# Patient Record
Sex: MALE | Race: BLACK OR AFRICAN AMERICAN | URBAN - METROPOLITAN AREA
[De-identification: names, ages, dates, MRNs, and addresses within clinical notes are randomized per-mention and may not be internally consistent; named-entity substitution may affect disease eponyms.]

---

## 2021-01-04 ENCOUNTER — OFFICE VISIT (OUTPATIENT)
Dept: URBAN - METROPOLITAN AREA CLINIC 92 | Facility: CLINIC | Age: 61
End: 2021-01-04

## 2022-06-22 ENCOUNTER — OFFICE VISIT (OUTPATIENT)
Dept: URBAN - METROPOLITAN AREA CLINIC 92 | Facility: CLINIC | Age: 62
End: 2022-06-22

## 2022-09-27 ENCOUNTER — OFFICE VISIT (OUTPATIENT)
Dept: URBAN - METROPOLITAN AREA CLINIC 92 | Facility: CLINIC | Age: 62
End: 2022-09-27
Payer: COMMERCIAL

## 2022-09-27 ENCOUNTER — DASHBOARD ENCOUNTERS (OUTPATIENT)
Age: 62
End: 2022-09-27

## 2022-09-27 ENCOUNTER — WEB ENCOUNTER (OUTPATIENT)
Dept: URBAN - METROPOLITAN AREA CLINIC 92 | Facility: CLINIC | Age: 62
End: 2022-09-27

## 2022-09-27 VITALS
TEMPERATURE: 97.1 F | HEART RATE: 66 BPM | HEIGHT: 73 IN | BODY MASS INDEX: 16.7 KG/M2 | WEIGHT: 126 LBS | SYSTOLIC BLOOD PRESSURE: 120 MMHG | DIASTOLIC BLOOD PRESSURE: 84 MMHG

## 2022-09-27 DIAGNOSIS — R13.19 ESOPHAGEAL DYSPHAGIA: ICD-10-CM

## 2022-09-27 DIAGNOSIS — K21.9 GASTROESOPHAGEAL REFLUX DISEASE WITHOUT ESOPHAGITIS: ICD-10-CM

## 2022-09-27 DIAGNOSIS — K59.04 CHRONIC IDIOPATHIC CONSTIPATION: ICD-10-CM

## 2022-09-27 DIAGNOSIS — Z12.11 SCREENING FOR COLON CANCER: ICD-10-CM

## 2022-09-27 DIAGNOSIS — K08.9 POOR DENTITION: ICD-10-CM

## 2022-09-27 PROBLEM — 82934008: Status: ACTIVE | Noted: 2022-09-27

## 2022-09-27 PROBLEM — 266435005: Status: ACTIVE | Noted: 2022-09-27

## 2022-09-27 PROCEDURE — 99204 OFFICE O/P NEW MOD 45 MIN: CPT

## 2022-09-27 RX ORDER — ONDANSETRON 4 MG/1
1 TABLET ON THE TONGUE AND ALLOW TO DISSOLVE TABLET, ORALLY DISINTEGRATING ORAL ONCE A DAY
Status: ACTIVE | COMMUNITY

## 2022-09-27 RX ORDER — BUDESONIDE AND FORMOTEROL FUMARATE DIHYDRATE 160; 4.5 UG/1; UG/1
2 PUFFS AEROSOL RESPIRATORY (INHALATION) TWICE A DAY
Status: ACTIVE | COMMUNITY

## 2022-09-27 RX ORDER — METOPROLOL SUCCINATE 25 MG/1
1 TABLET TABLET, FILM COATED, EXTENDED RELEASE ORAL ONCE A DAY
Status: ACTIVE | COMMUNITY

## 2022-09-27 RX ORDER — FERROUS SULFATE 325(65) MG
1 TABLET TABLET ORAL ONCE A DAY
Status: ACTIVE | COMMUNITY

## 2022-09-27 RX ORDER — CLOPIDOGREL 75 MG/1
1 TABLET TABLET, FILM COATED ORAL ONCE A DAY
Status: ACTIVE | COMMUNITY

## 2022-09-27 RX ORDER — NITROGLYCERIN 0.3 MG/1
AS DIRECTED TABLET SUBLINGUAL
Status: ACTIVE | COMMUNITY

## 2022-09-27 RX ORDER — ROSUVASTATIN CALCIUM 20 MG/1
1 TABLET TABLET, FILM COATED ORAL ONCE A DAY
Status: ACTIVE | COMMUNITY

## 2022-09-27 RX ORDER — LISINOPRIL AND HYDROCHLOROTHIAZIDE 20; 25 MG/1; MG/1
1 TABLET TABLET ORAL ONCE A DAY
Status: ACTIVE | COMMUNITY

## 2022-09-27 RX ORDER — APIXABAN 5 MG/1
1 TABLET TABLET, FILM COATED ORAL TWICE A DAY
Status: ACTIVE | COMMUNITY

## 2022-09-27 NOTE — HPI-TODAY'S VISIT:
62-year-old patient presents today for dysphagia and colonoscopy screening.  He was sent to us by Dr. Ana Segundo.  A copy of this note will be sent to referring provider.  Inpatient EGD 12- acute and erosive esophagitis was done due to J LUIS, dyspepsia, coffee-ground emesis, hematemesis nausea with vomiting biopsies were negative for H. pylori or any mucosal abnormality.  Patient had a stroke in 1979 and is unable to speak.  He is seen by his family friend who lives with patient to translate.  Patient states he has never had a colonoscopy before.  He has 1 bowel movement a day and states he is usually constipated and has to strain.  He has never been on anything for constipation in the past.  Denies hematochezia, melena, diarrhea, unexpected weight loss.  Denies family history of colon cancer or colon polyps. He also has dysphagia to all liquids and solids has been present for 2 years.  Denies any prior radiation or surgery in neck area.  He does have poor dentition and has not able to chew food.  Caregiver states they have been grinding up all his food and he has less sx with this.  He does have heartburn and regurgitation and has to take Tums almost every day.  He has not been on maintenance PPI or H2 blocker in the past.  Denies odynophagia, NSAID use. He is on Eliquis and Plavix per PCP's last note due to an NSTEMI patient is not aware of this.  He was told that we will will need cardiac clearance prior to procedures.

## 2022-11-08 ENCOUNTER — TELEPHONE ENCOUNTER (OUTPATIENT)
Dept: URBAN - METROPOLITAN AREA CLINIC 7 | Facility: CLINIC | Age: 62
End: 2022-11-08

## 2022-12-23 ENCOUNTER — TELEPHONE ENCOUNTER (OUTPATIENT)
Dept: URBAN - METROPOLITAN AREA CLINIC 92 | Facility: CLINIC | Age: 62
End: 2022-12-23

## 2022-12-27 ENCOUNTER — OFFICE VISIT (OUTPATIENT)
Dept: URBAN - METROPOLITAN AREA CLINIC 92 | Facility: CLINIC | Age: 62
End: 2022-12-27

## 2022-12-27 RX ORDER — FERROUS SULFATE 325(65) MG
1 TABLET TABLET ORAL ONCE A DAY
Status: ACTIVE | COMMUNITY

## 2022-12-27 RX ORDER — ONDANSETRON 4 MG/1
1 TABLET ON THE TONGUE AND ALLOW TO DISSOLVE TABLET, ORALLY DISINTEGRATING ORAL ONCE A DAY
Status: ACTIVE | COMMUNITY

## 2022-12-27 RX ORDER — APIXABAN 5 MG/1
1 TABLET TABLET, FILM COATED ORAL TWICE A DAY
Status: ACTIVE | COMMUNITY

## 2022-12-27 RX ORDER — LISINOPRIL AND HYDROCHLOROTHIAZIDE 20; 25 MG/1; MG/1
1 TABLET TABLET ORAL ONCE A DAY
Status: ACTIVE | COMMUNITY

## 2022-12-27 RX ORDER — NITROGLYCERIN 0.3 MG/1
AS DIRECTED TABLET SUBLINGUAL
Status: ACTIVE | COMMUNITY

## 2022-12-27 RX ORDER — ROSUVASTATIN CALCIUM 20 MG/1
1 TABLET TABLET, FILM COATED ORAL ONCE A DAY
Status: ACTIVE | COMMUNITY

## 2022-12-27 RX ORDER — METOPROLOL SUCCINATE 25 MG/1
1 TABLET TABLET, FILM COATED, EXTENDED RELEASE ORAL ONCE A DAY
Status: ACTIVE | COMMUNITY

## 2022-12-27 RX ORDER — BUDESONIDE AND FORMOTEROL FUMARATE DIHYDRATE 160; 4.5 UG/1; UG/1
2 PUFFS AEROSOL RESPIRATORY (INHALATION) TWICE A DAY
Status: ACTIVE | COMMUNITY

## 2022-12-27 RX ORDER — CLOPIDOGREL 75 MG/1
1 TABLET TABLET, FILM COATED ORAL ONCE A DAY
Status: ACTIVE | COMMUNITY